# Patient Record
Sex: FEMALE | Race: WHITE
[De-identification: names, ages, dates, MRNs, and addresses within clinical notes are randomized per-mention and may not be internally consistent; named-entity substitution may affect disease eponyms.]

---

## 2020-08-17 ENCOUNTER — HOSPITAL ENCOUNTER (INPATIENT)
Dept: HOSPITAL 19 - MEDICAL | Age: 65
LOS: 2 days | Discharge: HOME | DRG: 446 | End: 2020-08-19
Attending: INTERNAL MEDICINE | Admitting: INTERNAL MEDICINE
Payer: COMMERCIAL

## 2020-08-17 VITALS — TEMPERATURE: 98.9 F | SYSTOLIC BLOOD PRESSURE: 142 MMHG | HEART RATE: 112 BPM | DIASTOLIC BLOOD PRESSURE: 84 MMHG

## 2020-08-17 VITALS — HEIGHT: 65.98 IN | BODY MASS INDEX: 37.27 KG/M2 | WEIGHT: 231.93 LBS

## 2020-08-17 VITALS — TEMPERATURE: 99.2 F | SYSTOLIC BLOOD PRESSURE: 149 MMHG | HEART RATE: 100 BPM | DIASTOLIC BLOOD PRESSURE: 85 MMHG

## 2020-08-17 DIAGNOSIS — K83.8: ICD-10-CM

## 2020-08-17 DIAGNOSIS — G43.909: ICD-10-CM

## 2020-08-17 DIAGNOSIS — K21.9: ICD-10-CM

## 2020-08-17 DIAGNOSIS — N83.202: ICD-10-CM

## 2020-08-17 DIAGNOSIS — F41.9: ICD-10-CM

## 2020-08-17 DIAGNOSIS — Z79.82: ICD-10-CM

## 2020-08-17 DIAGNOSIS — R00.2: ICD-10-CM

## 2020-08-17 DIAGNOSIS — D47.3: ICD-10-CM

## 2020-08-17 DIAGNOSIS — E11.65: ICD-10-CM

## 2020-08-17 DIAGNOSIS — Z90.49: ICD-10-CM

## 2020-08-17 DIAGNOSIS — K80.50: Primary | ICD-10-CM

## 2020-08-17 DIAGNOSIS — I10: ICD-10-CM

## 2020-08-17 LAB
ALBUMIN SERPL-MCNC: 4.4 GM/DL (ref 3.5–5)
ALP SERPL-CCNC: 238 U/L (ref 50–136)
ALT SERPL-CCNC: 884 U/L (ref 4–34)
ANION GAP SERPL CALC-SCNC: 7 MMOL/L (ref 7–16)
AST SERPL-CCNC: 705 U/L (ref 15–37)
BASOPHILS # BLD: 0 10*3/UL (ref 0–0.2)
BASOPHILS NFR BLD AUTO: 0.4 % (ref 0–2)
BILIRUB SERPL-MCNC: 4.8 MG/DL (ref 0–1)
BUN SERPL-MCNC: 12 MG/DL (ref 7–17)
CALCIUM SERPL-MCNC: 9.5 MG/DL (ref 8.4–10.2)
CHLORIDE SERPL-SCNC: 107 MMOL/L (ref 98–107)
CO2 SERPL-SCNC: 24 MMOL/L (ref 22–30)
CREAT SERPL-SCNC: 1 UMOL/L (ref 0.52–1.25)
EOSINOPHIL # BLD: 0.1 10*3/UL (ref 0–0.7)
EOSINOPHIL NFR BLD: 0.5 % (ref 0–4)
ERYTHROCYTE [DISTWIDTH] IN BLOOD BY AUTOMATED COUNT: 14.7 % (ref 11.5–14.5)
GLUCOSE SERPL-MCNC: 234 MG/DL (ref 74–106)
GRANULOCYTES # BLD AUTO: 77.5 % (ref 42.2–75.2)
HCT VFR BLD AUTO: 40.8 % (ref 37–47)
HGB BLD-MCNC: 13.7 G/DL (ref 12.5–16)
INR BLD: 1.1 (ref 0.8–3)
LIPASE SERPL-CCNC: 86 U/L (ref 23–300)
LYMPHOCYTES # BLD: 1.5 10*3/UL (ref 1.2–3.4)
LYMPHOCYTES NFR BLD: 13.6 % (ref 20–51)
MCH RBC QN AUTO: 30 PG (ref 27–31)
MCHC RBC AUTO-ENTMCNC: 34 G/DL (ref 33–37)
MCV RBC AUTO: 90 FL (ref 80–100)
MONOCYTES # BLD: 0.8 10*3/UL (ref 0.1–0.6)
MONOCYTES NFR BLD AUTO: 7.5 % (ref 1.7–9.3)
NEUTROPHILS # BLD: 8.5 10*3/UL (ref 1.4–6.5)
PLATELET # BLD AUTO: 458 K/MM3 (ref 130–400)
PMV BLD AUTO: 9.2 FL (ref 7.4–10.4)
POTASSIUM SERPL-SCNC: 4.5 MMOL/L (ref 3.4–5)
PROT SERPL-MCNC: 8.1 GM/DL (ref 6.4–8.2)
PROTHROMBIN TIME: 12.6 SECONDS (ref 9.7–12.8)
RBC # BLD AUTO: 4.54 M/MM3 (ref 4.1–5.3)
SODIUM SERPL-SCNC: 139 MMOL/L (ref 137–145)

## 2020-08-17 PROCEDURE — C2625 STENT, NON-COR, TEM W/DEL SY: HCPCS

## 2020-08-17 PROCEDURE — C1769 GUIDE WIRE: HCPCS

## 2020-08-17 PROCEDURE — G0378 HOSPITAL OBSERVATION PER HR: HCPCS

## 2020-08-17 NOTE — NUR
MEDICATED WITH MORPHINE 2MG IVP FOR PAIN 6/10 TO ABDOMEN. EKG COMPLETED BY RT.
IS ON TELEMETRY AT THIS TIME. IS TO HAVE CT SCAN. UP INDEPENDENTLY IN ROOM.

## 2020-08-18 VITALS — HEART RATE: 83 BPM | DIASTOLIC BLOOD PRESSURE: 73 MMHG | SYSTOLIC BLOOD PRESSURE: 124 MMHG

## 2020-08-18 VITALS — TEMPERATURE: 98.4 F | DIASTOLIC BLOOD PRESSURE: 70 MMHG | HEART RATE: 92 BPM | SYSTOLIC BLOOD PRESSURE: 116 MMHG

## 2020-08-18 VITALS — DIASTOLIC BLOOD PRESSURE: 73 MMHG | SYSTOLIC BLOOD PRESSURE: 131 MMHG | HEART RATE: 100 BPM | TEMPERATURE: 98.1 F

## 2020-08-18 VITALS — TEMPERATURE: 98.5 F | SYSTOLIC BLOOD PRESSURE: 128 MMHG | DIASTOLIC BLOOD PRESSURE: 80 MMHG | HEART RATE: 72 BPM

## 2020-08-18 VITALS — DIASTOLIC BLOOD PRESSURE: 55 MMHG | HEART RATE: 71 BPM | SYSTOLIC BLOOD PRESSURE: 112 MMHG

## 2020-08-18 VITALS — SYSTOLIC BLOOD PRESSURE: 110 MMHG | HEART RATE: 83 BPM | DIASTOLIC BLOOD PRESSURE: 58 MMHG

## 2020-08-18 VITALS — SYSTOLIC BLOOD PRESSURE: 124 MMHG | DIASTOLIC BLOOD PRESSURE: 76 MMHG | HEART RATE: 81 BPM

## 2020-08-18 VITALS — SYSTOLIC BLOOD PRESSURE: 127 MMHG | DIASTOLIC BLOOD PRESSURE: 79 MMHG | HEART RATE: 68 BPM

## 2020-08-18 VITALS — SYSTOLIC BLOOD PRESSURE: 111 MMHG | DIASTOLIC BLOOD PRESSURE: 68 MMHG | HEART RATE: 69 BPM

## 2020-08-18 VITALS — DIASTOLIC BLOOD PRESSURE: 75 MMHG | HEART RATE: 88 BPM | SYSTOLIC BLOOD PRESSURE: 106 MMHG

## 2020-08-18 LAB
ALBUMIN SERPL-MCNC: 3.8 GM/DL (ref 3.5–5)
ALP SERPL-CCNC: 237 U/L (ref 50–136)
ALT SERPL-CCNC: 766 U/L (ref 4–34)
ANION GAP SERPL CALC-SCNC: 7 MMOL/L (ref 7–16)
AST SERPL-CCNC: 607 U/L (ref 15–37)
BASOPHILS # BLD: 0.1 10*3/UL (ref 0–0.2)
BASOPHILS NFR BLD AUTO: 0.5 % (ref 0–2)
BILIRUB SERPL-MCNC: 4.6 MG/DL (ref 0–1)
BUN SERPL-MCNC: 9 MG/DL (ref 7–17)
CALCIUM SERPL-MCNC: 8.7 MG/DL (ref 8.4–10.2)
CHLORIDE SERPL-SCNC: 108 MMOL/L (ref 98–107)
CO2 SERPL-SCNC: 24 MMOL/L (ref 22–30)
CREAT SERPL-SCNC: 0.88 UMOL/L (ref 0.52–1.25)
EOSINOPHIL # BLD: 0.1 10*3/UL (ref 0–0.7)
EOSINOPHIL NFR BLD: 1.3 % (ref 0–4)
ERYTHROCYTE [DISTWIDTH] IN BLOOD BY AUTOMATED COUNT: 14.8 % (ref 11.5–14.5)
GLUCOSE SERPL-MCNC: 159 MG/DL (ref 74–106)
GRANULOCYTES # BLD AUTO: 60 % (ref 42.2–75.2)
HCT VFR BLD AUTO: 38.2 % (ref 37–47)
HGB BLD-MCNC: 12.8 G/DL (ref 12.5–16)
LYMPHOCYTES # BLD: 2.8 10*3/UL (ref 1.2–3.4)
LYMPHOCYTES NFR BLD: 29.5 % (ref 20–51)
MCH RBC QN AUTO: 30 PG (ref 27–31)
MCHC RBC AUTO-ENTMCNC: 34 G/DL (ref 33–37)
MCV RBC AUTO: 90 FL (ref 80–100)
MONOCYTES # BLD: 0.8 10*3/UL (ref 0.1–0.6)
MONOCYTES NFR BLD AUTO: 8.4 % (ref 1.7–9.3)
NEUTROPHILS # BLD: 5.6 10*3/UL (ref 1.4–6.5)
PLATELET # BLD AUTO: 412 K/MM3 (ref 130–400)
PMV BLD AUTO: 9.5 FL (ref 7.4–10.4)
POTASSIUM SERPL-SCNC: 3.7 MMOL/L (ref 3.4–5)
PROT SERPL-MCNC: 7.2 GM/DL (ref 6.4–8.2)
RBC # BLD AUTO: 4.25 M/MM3 (ref 4.1–5.3)
SODIUM SERPL-SCNC: 138 MMOL/L (ref 137–145)

## 2020-08-18 PROCEDURE — 0FCD8ZZ EXTIRPATION OF MATTER FROM PANCREATIC DUCT, VIA NATURAL OR ARTIFICIAL OPENING ENDOSCOPIC: ICD-10-PCS | Performed by: PSYCHIATRY & NEUROLOGY

## 2020-08-18 PROCEDURE — 0F798DZ DILATION OF COMMON BILE DUCT WITH INTRALUMINAL DEVICE, VIA NATURAL OR ARTIFICIAL OPENING ENDOSCOPIC: ICD-10-PCS | Performed by: SPECIALIST

## 2020-08-18 NOTE — NUR
Pt continued to have pain following procedure. 6/10, PRN pain medication
administered. Ate dinner without N/V. Has dark orange urine. IVF infusing
without complications. No needs at this time. Call light within reach.

## 2020-08-18 NOTE — NUR
IVF CONNECTED TO LEFT HAND IV SITE. INFUSING WITHOUT PROBLEM. ENCOURAGED PT TO
CALL IF NEEDING PAIN MEDICATION. IS NPO FOR PROCEDURE AT 1430.

## 2020-08-18 NOTE — NUR
Pt. laying in bed at this time. Pt. is A&OX3, assessment complete. IV to lt.
hand patent, IV fluids infusing per orders.  Pt. reports pain to abd. at a 7
on pain scale, gave pain meds per orders.  Pt. reports that she peralta not feel
like the pain medication is helping.  Dr. Wagner, called, no answer at this
time.  Pt. denies further needs, call light within reach.

## 2020-08-18 NOTE — NUR
Pt assessment complete. Pt is laying in bed upon entry, she is A/O x4. Her
breathing is even and unlabored on RA. Pt denies SOB. Pain improved with
morphine, denies N/V. POC discussed with patient who states she is nervous
about ERCP, education provided. Pt has been NPO. No BM's overnight, asking
about CT scan. IVF infusing without issues. No needs at this time.

## 2020-08-18 NOTE — NUR
met with patient to discuss discharge planning. Patient lives in
[place] with her , Grupo (home#[phone], cell#[phone]) and
sees Dr. Kendrick for primary care. Patient obtains medications from [facility]
with no difficulties. Patient does not use any DME and is independent with
ADLS. Patient does not have Advance Directives and is not interested in
setting them up at this time. Patient plans to return home upon discharge. No
needs identified at this time.

## 2020-08-19 VITALS — DIASTOLIC BLOOD PRESSURE: 72 MMHG | TEMPERATURE: 98.2 F | HEART RATE: 81 BPM | SYSTOLIC BLOOD PRESSURE: 121 MMHG

## 2020-08-19 VITALS — HEART RATE: 105 BPM | SYSTOLIC BLOOD PRESSURE: 119 MMHG | DIASTOLIC BLOOD PRESSURE: 67 MMHG | TEMPERATURE: 98.3 F

## 2020-08-19 LAB
ALBUMIN SERPL-MCNC: 3.6 GM/DL (ref 3.5–5)
ALP SERPL-CCNC: 215 U/L (ref 50–136)
ALT SERPL-CCNC: 683 U/L (ref 4–34)
ANION GAP SERPL CALC-SCNC: 7 MMOL/L (ref 7–16)
AST SERPL-CCNC: 436 U/L (ref 15–37)
BASOPHILS # BLD: 0 10*3/UL (ref 0–0.2)
BASOPHILS NFR BLD AUTO: 0.3 % (ref 0–2)
BILIRUB SERPL-MCNC: 2.6 MG/DL (ref 0–1)
BUN SERPL-MCNC: 10 MG/DL (ref 7–17)
CALCIUM SERPL-MCNC: 8.2 MG/DL (ref 8.4–10.2)
CHLORIDE SERPL-SCNC: 107 MMOL/L (ref 98–107)
CO2 SERPL-SCNC: 21 MMOL/L (ref 22–30)
CREAT SERPL-SCNC: 0.83 UMOL/L (ref 0.52–1.25)
EOSINOPHIL # BLD: 0.1 10*3/UL (ref 0–0.7)
EOSINOPHIL NFR BLD: 0.4 % (ref 0–4)
ERYTHROCYTE [DISTWIDTH] IN BLOOD BY AUTOMATED COUNT: 14.6 % (ref 11.5–14.5)
GLUCOSE SERPL-MCNC: 218 MG/DL (ref 74–106)
GRANULOCYTES # BLD AUTO: 77.9 % (ref 42.2–75.2)
HCT VFR BLD AUTO: 37.8 % (ref 37–47)
HGB BLD-MCNC: 12.7 G/DL (ref 12.5–16)
LYMPHOCYTES # BLD: 2 10*3/UL (ref 1.2–3.4)
LYMPHOCYTES NFR BLD: 15.3 % (ref 20–51)
MAGNESIUM SERPL-MCNC: 2 MG/DL (ref 1.6–2.3)
MCH RBC QN AUTO: 30 PG (ref 27–31)
MCHC RBC AUTO-ENTMCNC: 34 G/DL (ref 33–37)
MCV RBC AUTO: 90 FL (ref 80–100)
MONOCYTES # BLD: 0.8 10*3/UL (ref 0.1–0.6)
MONOCYTES NFR BLD AUTO: 5.7 % (ref 1.7–9.3)
NEUTROPHILS # BLD: 10.4 10*3/UL (ref 1.4–6.5)
PLATELET # BLD AUTO: 420 K/MM3 (ref 130–400)
PMV BLD AUTO: 9.8 FL (ref 7.4–10.4)
POTASSIUM SERPL-SCNC: 3.9 MMOL/L (ref 3.4–5)
PROT SERPL-MCNC: 6.9 GM/DL (ref 6.4–8.2)
RBC # BLD AUTO: 4.21 M/MM3 (ref 4.1–5.3)
SODIUM SERPL-SCNC: 135 MMOL/L (ref 137–145)

## 2020-08-19 NOTE — NUR
Discharge instructions reviewed with patient, verbalized understanding.
Discharged via wheelchair to auto/home with spouse at 1020.

## 2020-08-19 NOTE — NUR
Patient alert and oriented, answers questions appropriately.  See assessment.
Abdomen soft, non tender, non distended.  Bowel sounds hyperactive x4 quads.
+Flatus. Skin tones normal, sclera clear.  C/o mild abdominal pain.  No other
c/o at this time.

## 2020-09-25 ENCOUNTER — HOSPITAL ENCOUNTER (OUTPATIENT)
Dept: HOSPITAL 19 - SDCO | Age: 65
Discharge: HOME | End: 2020-09-25
Attending: SPECIALIST
Payer: COMMERCIAL

## 2020-09-25 VITALS — HEART RATE: 79 BPM | DIASTOLIC BLOOD PRESSURE: 77 MMHG | TEMPERATURE: 98 F | SYSTOLIC BLOOD PRESSURE: 117 MMHG

## 2020-09-25 VITALS — BODY MASS INDEX: 37.91 KG/M2 | HEIGHT: 66 IN | WEIGHT: 235.89 LBS

## 2020-09-25 VITALS — DIASTOLIC BLOOD PRESSURE: 71 MMHG | HEART RATE: 71 BPM | SYSTOLIC BLOOD PRESSURE: 122 MMHG

## 2020-09-25 VITALS — DIASTOLIC BLOOD PRESSURE: 81 MMHG | SYSTOLIC BLOOD PRESSURE: 119 MMHG | HEART RATE: 77 BPM | TEMPERATURE: 98.2 F

## 2020-09-25 VITALS — HEART RATE: 70 BPM | DIASTOLIC BLOOD PRESSURE: 74 MMHG | SYSTOLIC BLOOD PRESSURE: 120 MMHG

## 2020-09-25 VITALS — DIASTOLIC BLOOD PRESSURE: 65 MMHG | HEART RATE: 74 BPM | SYSTOLIC BLOOD PRESSURE: 119 MMHG

## 2020-09-25 DIAGNOSIS — F41.9: ICD-10-CM

## 2020-09-25 DIAGNOSIS — G43.909: ICD-10-CM

## 2020-09-25 DIAGNOSIS — K31.89: Primary | ICD-10-CM

## 2020-09-25 DIAGNOSIS — E11.9: ICD-10-CM

## 2020-09-25 DIAGNOSIS — K80.50: ICD-10-CM

## 2020-09-25 DIAGNOSIS — Z90.49: ICD-10-CM

## 2020-09-25 DIAGNOSIS — Z79.82: ICD-10-CM

## 2020-09-25 DIAGNOSIS — K21.9: ICD-10-CM

## 2020-09-25 DIAGNOSIS — I10: ICD-10-CM

## 2020-09-25 PROCEDURE — C1769 GUIDE WIRE: HCPCS

## 2020-09-25 NOTE — NUR
Patient reports light pain in right upper abdomen. Verbal order obtained from
Dr Bosch. Patient tolerating jello and water without any nausea.

## 2020-09-25 NOTE — NUR
Patient arrives back to [facility] alert, denies pain and nausea. Patient given water
and jello. Patient monitor applied, vitals stable. Patient resting comfortably
in chair.

## 2020-09-25 NOTE — NUR
Dismissal instructions gone over with patient and patient's  at this
time. Both verbalize understanding and all questions answered.